# Patient Record
Sex: MALE
[De-identification: names, ages, dates, MRNs, and addresses within clinical notes are randomized per-mention and may not be internally consistent; named-entity substitution may affect disease eponyms.]

---

## 2018-01-01 ENCOUNTER — HOSPITAL ENCOUNTER (EMERGENCY)
Dept: HOSPITAL 88 - ER | Age: 33
LOS: 1 days | Discharge: HOME | End: 2018-01-02
Payer: MEDICARE

## 2018-01-01 VITALS — WEIGHT: 250 LBS | BODY MASS INDEX: 35.79 KG/M2 | HEIGHT: 70 IN

## 2018-01-01 DIAGNOSIS — R05: Primary | ICD-10-CM

## 2018-01-01 DIAGNOSIS — J11.1: ICD-10-CM

## 2018-01-01 PROCEDURE — 71020: CPT

## 2018-01-01 PROCEDURE — 87400 INFLUENZA A/B EACH AG IA: CPT

## 2018-01-01 PROCEDURE — 99283 EMERGENCY DEPT VISIT LOW MDM: CPT

## 2018-01-02 NOTE — DIAGNOSTIC IMAGING REPORT
EXAMINATION:  CHEST 2 VIEWS    



INDICATION:           cough. 



COMPARISON:  None

     

FINDINGS:

TUBES and LINES:  None.



LUNGS:  Lungs are not well inflated.  There are bibasilar atelectasis.   There

is mild prominence of the central pulmonary vasculature, consistent with

pulmonary venous congestion.



PLEURA:  No pleural effusion or pneumothorax.



HEART AND MEDIASTINUM:  The cardiomediastinal silhouette is unremarkable.    



BONES AND SOFT TISSUES:  No acute osseous lesion.  Soft tissues are

unremarkable.



UPPER ABDOMEN: No free air under the diaphragm.    



IMPRESSION: 

No acute thoracic abnormality.





Signed by: Dr. Ike Fernandes M.D. on 1/2/2018 2:22 AM